# Patient Record
Sex: FEMALE | Race: ASIAN | NOT HISPANIC OR LATINO | Employment: FULL TIME | URBAN - METROPOLITAN AREA
[De-identification: names, ages, dates, MRNs, and addresses within clinical notes are randomized per-mention and may not be internally consistent; named-entity substitution may affect disease eponyms.]

---

## 2017-01-19 ENCOUNTER — PRENATAL OFFICE VISIT - HEALTHEAST (OUTPATIENT)
Dept: FAMILY MEDICINE | Facility: CLINIC | Age: 27
End: 2017-01-19

## 2017-01-19 ENCOUNTER — HOSPITAL ENCOUNTER (OUTPATIENT)
Dept: ULTRASOUND IMAGING | Facility: HOSPITAL | Age: 27
Discharge: HOME | End: 2017-01-19
Attending: FAMILY MEDICINE

## 2017-01-19 DIAGNOSIS — Z3A.10 10 WEEKS GESTATION OF PREGNANCY: ICD-10-CM

## 2017-01-19 LAB — HIV 1+2 AB+HIV1 P24 AG SERPL QL IA: NEGATIVE

## 2017-01-20 LAB
HBV SURFACE AG SERPL QL IA: NEGATIVE
SYPHILIS RPR SCREEN - HISTORICAL: NORMAL

## 2017-02-23 ENCOUNTER — PRENATAL OFFICE VISIT - HEALTHEAST (OUTPATIENT)
Dept: FAMILY MEDICINE | Facility: CLINIC | Age: 27
End: 2017-02-23

## 2017-02-23 DIAGNOSIS — Z34.90 PREGNANCY: ICD-10-CM

## 2017-03-27 ENCOUNTER — HOSPITAL ENCOUNTER (OUTPATIENT)
Dept: ULTRASOUND IMAGING | Facility: HOSPITAL | Age: 27
Discharge: HOME | End: 2017-03-27
Attending: FAMILY MEDICINE

## 2017-03-27 ENCOUNTER — COMMUNICATION - HEALTHEAST (OUTPATIENT)
Dept: FAMILY MEDICINE | Facility: CLINIC | Age: 27
End: 2017-03-27

## 2017-03-27 DIAGNOSIS — Z34.90 PREGNANCY: ICD-10-CM

## 2017-03-27 DIAGNOSIS — Z33.1 PREGNANT STATE, INCIDENTAL: ICD-10-CM

## 2017-03-30 ENCOUNTER — PRENATAL OFFICE VISIT - HEALTHEAST (OUTPATIENT)
Dept: FAMILY MEDICINE | Facility: CLINIC | Age: 27
End: 2017-03-30

## 2017-03-30 DIAGNOSIS — Z3A.20 20 WEEKS GESTATION OF PREGNANCY: ICD-10-CM

## 2017-04-27 ENCOUNTER — PRENATAL OFFICE VISIT - HEALTHEAST (OUTPATIENT)
Dept: FAMILY MEDICINE | Facility: CLINIC | Age: 27
End: 2017-04-27

## 2017-04-27 DIAGNOSIS — Z3A.24 24 WEEKS GESTATION OF PREGNANCY: ICD-10-CM

## 2017-04-28 LAB — SYPHILIS RPR SCREEN - HISTORICAL: NORMAL

## 2017-05-03 ENCOUNTER — AMBULATORY - HEALTHEAST (OUTPATIENT)
Dept: LAB | Facility: CLINIC | Age: 27
End: 2017-05-03

## 2017-05-03 DIAGNOSIS — Z3A.24 24 WEEKS GESTATION OF PREGNANCY: ICD-10-CM

## 2017-05-03 DIAGNOSIS — O24.419 GESTATIONAL DIABETES: ICD-10-CM

## 2017-05-12 ENCOUNTER — AMBULATORY - HEALTHEAST (OUTPATIENT)
Dept: EDUCATION SERVICES | Facility: CLINIC | Age: 27
End: 2017-05-12

## 2017-05-12 DIAGNOSIS — O24.410 DIET CONTROLLED GESTATIONAL DIABETES MELLITUS (GDM), ANTEPARTUM: ICD-10-CM

## 2017-05-12 DIAGNOSIS — Z3A.24 24 WEEKS GESTATION OF PREGNANCY: ICD-10-CM

## 2017-05-16 ENCOUNTER — AMBULATORY - HEALTHEAST (OUTPATIENT)
Dept: EDUCATION SERVICES | Facility: CLINIC | Age: 27
End: 2017-05-16

## 2017-05-16 DIAGNOSIS — Z3A.27 27 WEEKS GESTATION OF PREGNANCY: ICD-10-CM

## 2017-05-16 DIAGNOSIS — O24.410 DIET CONTROLLED GESTATIONAL DIABETES MELLITUS (GDM), ANTEPARTUM: ICD-10-CM

## 2017-05-25 ENCOUNTER — PRENATAL OFFICE VISIT - HEALTHEAST (OUTPATIENT)
Dept: FAMILY MEDICINE | Facility: CLINIC | Age: 27
End: 2017-05-25

## 2017-05-25 DIAGNOSIS — Z34.90 PREGNANCY: ICD-10-CM

## 2017-05-30 ENCOUNTER — AMBULATORY - HEALTHEAST (OUTPATIENT)
Dept: EDUCATION SERVICES | Facility: CLINIC | Age: 27
End: 2017-05-30

## 2017-05-30 ENCOUNTER — COMMUNICATION - HEALTHEAST (OUTPATIENT)
Dept: EDUCATION SERVICES | Facility: CLINIC | Age: 27
End: 2017-05-30

## 2017-06-05 ENCOUNTER — COMMUNICATION - HEALTHEAST (OUTPATIENT)
Dept: FAMILY MEDICINE | Facility: CLINIC | Age: 27
End: 2017-06-05

## 2017-06-08 ENCOUNTER — PRENATAL OFFICE VISIT - HEALTHEAST (OUTPATIENT)
Dept: FAMILY MEDICINE | Facility: CLINIC | Age: 27
End: 2017-06-08

## 2017-06-08 ENCOUNTER — HOSPITAL ENCOUNTER (OUTPATIENT)
Dept: ULTRASOUND IMAGING | Facility: HOSPITAL | Age: 27
Discharge: HOME | End: 2017-06-08
Attending: FAMILY MEDICINE

## 2017-06-08 DIAGNOSIS — Z3A.30 30 WEEKS GESTATION OF PREGNANCY: ICD-10-CM

## 2017-06-09 ENCOUNTER — COMMUNICATION - HEALTHEAST (OUTPATIENT)
Dept: FAMILY MEDICINE | Facility: CLINIC | Age: 27
End: 2017-06-09

## 2017-06-12 ENCOUNTER — PRENATAL OFFICE VISIT - HEALTHEAST (OUTPATIENT)
Dept: FAMILY MEDICINE | Facility: CLINIC | Age: 27
End: 2017-06-12

## 2017-06-12 DIAGNOSIS — Z3A.31 31 WEEKS GESTATION OF PREGNANCY: ICD-10-CM

## 2017-06-12 LAB
ALT SERPL W P-5'-P-CCNC: 9 U/L (ref 0–45)
AST SERPL W P-5'-P-CCNC: 21 U/L (ref 0–40)
CREAT SERPL-MCNC: 0.75 MG/DL (ref 0.6–1.1)
GFR SERPL CREATININE-BSD FRML MDRD: >60 ML/MIN/1.73M2

## 2017-06-26 ENCOUNTER — AMBULATORY - HEALTHEAST (OUTPATIENT)
Dept: FAMILY MEDICINE | Facility: CLINIC | Age: 27
End: 2017-06-26

## 2017-06-26 DIAGNOSIS — O14.93 PRE-ECLAMPSIA IN THIRD TRIMESTER: ICD-10-CM

## 2017-07-21 ENCOUNTER — COMMUNICATION - HEALTHEAST (OUTPATIENT)
Dept: FAMILY MEDICINE | Facility: CLINIC | Age: 27
End: 2017-07-21

## 2017-08-14 ENCOUNTER — OFFICE VISIT - HEALTHEAST (OUTPATIENT)
Dept: FAMILY MEDICINE | Facility: CLINIC | Age: 27
End: 2017-08-14

## 2017-08-14 DIAGNOSIS — Z30.011 BCP (BIRTH CONTROL PILLS) INITIATION: ICD-10-CM

## 2017-09-19 ENCOUNTER — COMMUNICATION - HEALTHEAST (OUTPATIENT)
Dept: FAMILY MEDICINE | Facility: CLINIC | Age: 27
End: 2017-09-19

## 2017-10-09 ENCOUNTER — COMMUNICATION - HEALTHEAST (OUTPATIENT)
Dept: FAMILY MEDICINE | Facility: CLINIC | Age: 27
End: 2017-10-09

## 2017-10-12 ENCOUNTER — AMBULATORY - HEALTHEAST (OUTPATIENT)
Dept: LAB | Facility: CLINIC | Age: 27
End: 2017-10-12

## 2018-08-24 ENCOUNTER — COMMUNICATION - HEALTHEAST (OUTPATIENT)
Dept: FAMILY MEDICINE | Facility: CLINIC | Age: 28
End: 2018-08-24

## 2018-11-29 ENCOUNTER — COMMUNICATION - HEALTHEAST (OUTPATIENT)
Dept: FAMILY MEDICINE | Facility: CLINIC | Age: 28
End: 2018-11-29

## 2020-03-03 ENCOUNTER — VIRTUAL VISIT (OUTPATIENT)
Dept: FAMILY MEDICINE | Facility: OTHER | Age: 30
End: 2020-03-03

## 2021-05-16 ENCOUNTER — OFFICE VISIT (OUTPATIENT)
Dept: URGENT CARE | Facility: URGENT CARE | Age: 31
End: 2021-05-16
Payer: COMMERCIAL

## 2021-05-16 VITALS
HEART RATE: 88 BPM | DIASTOLIC BLOOD PRESSURE: 81 MMHG | RESPIRATION RATE: 18 BRPM | OXYGEN SATURATION: 96 % | TEMPERATURE: 98 F | WEIGHT: 146.2 LBS | SYSTOLIC BLOOD PRESSURE: 123 MMHG

## 2021-05-16 DIAGNOSIS — H18.891 CORNEAL IRRITATION OF RIGHT EYE: Primary | ICD-10-CM

## 2021-05-16 PROCEDURE — 99203 OFFICE O/P NEW LOW 30 MIN: CPT | Performed by: PHYSICIAN ASSISTANT

## 2021-05-16 RX ORDER — FOLIC ACID, .BETA.-CAROTENE, ASCORBIC ACID, CHOLECALCIFEROL, .ALPHA.-TOCOPHEROL ACETATE, DL-, THIAMINE MONONITRATE, RIBOFLAVIN, NIACINAMIDE, PYRIDOXINE HYDROCHLORIDE, CYANOCOBALAMIN, CALCIUM PANTOTHENATE, CALCIUM CARBONATE, FERROUS FUMARATE, AND ZINC OXIDE 1; 1000; 100; 400; 30; 3; 3; 15; 20; 12; 7; 200; 29; 20 MG/1; [IU]/1; MG/1; [IU]/1; [IU]/1; MG/1; MG/1; MG/1; MG/1; UG/1; MG/1; MG/1; MG/1; MG/1
TABLET, CHEWABLE ORAL
COMMUNITY
End: 2023-03-03

## 2021-05-16 RX ORDER — POLYETHYLENE GLYCOL 400 AND PROPYLENE GLYCOL 4; 3 MG/ML; MG/ML
1 SOLUTION/ DROPS OPHTHALMIC 3 TIMES DAILY PRN
Qty: 5 ML | Refills: 0 | Status: SHIPPED | OUTPATIENT
Start: 2021-05-16 | End: 2022-03-24

## 2021-05-16 ASSESSMENT — ENCOUNTER SYMPTOMS
PHOTOPHOBIA: 0
EYE PAIN: 1
FEVER: 0

## 2021-05-16 NOTE — PROGRESS NOTES
SUBJECTIVE:   Ariella Peterson is a 30 year old female presenting with a chief complaint of   Chief Complaint   Patient presents with     Eye Problem     Right eye irritation since she woke up yesterday. Pt had lasik last Friday       She is a new patient of [place].  Patient presents with right eye irritation since yesterday morning.  Patient had bilateral lasik surgery on 5/6.  Patient noted a white spot on upper iris.  Right eye more discharge upon awakening.  No acuity changes. No photophobia.  Patient called optho and was told they were closed and they would get back to her on Monday.  Patient given eye drops to put in - abx gtts.  No trauma and doesn't recall anything getting in eye.        Review of Systems   Constitutional: Negative for fever.   Eyes: Positive for pain. Negative for photophobia.   All other systems reviewed and are negative.      History reviewed. No pertinent past medical history.  History reviewed. No pertinent family history.  Current Outpatient Medications   Medication Sig Dispense Refill     polyethylene glycol-propylene glycol (SYSTANE) 0.4-0.3 % SOLN ophthalmic solution Place 1 drop into the right eye 3 times daily as needed for dry eyes 5 mL 0     Prenatal Vit-Fe Fumarate-FA (PRENATAL 19) 29-1 MG CHEW        Social History     Tobacco Use     Smoking status: Never Smoker     Smokeless tobacco: Never Used   Substance Use Topics     Alcohol use: Not on file       OBJECTIVE  /81   Pulse 88   Temp 98  F (36.7  C) (Tympanic)   Resp 18   Wt 66.3 kg (146 lb 3.2 oz)   SpO2 96%     Physical Exam  Vitals signs and nursing note reviewed.   Constitutional:       Appearance: Normal appearance. She is normal weight.   Eyes:      Extraocular Movements: Extraocular movements intact.      Conjunctiva/sclera: Conjunctivae normal.      Pupils: Pupils are equal, round, and reactive to light.      Comments: Visualization with light and with magnifying glass reveals no FB.  No FB noted on eye lid  eversion.  EOM, PERRLA normal.     Neurological:      Mental Status: She is alert.         Labs:  No results found for this or any previous visit (from the past 24 hour(s)).    X-Ray was not done.    ASSESSMENT:      ICD-10-CM    1. Corneal irritation of right eye  H18.891 polyethylene glycol-propylene glycol (SYSTANE) 0.4-0.3 % SOLN ophthalmic solution        Medical Decision Making:    Differential Diagnosis:  FB,     Serious Comorbid Conditions:  Adult:  as above    PLAN:    Discussed with patient that no FB is viewed, no is any abnormalities.  I recommended she continue with abx gtts.  Rx for Systane and f/u with optho on Monday.  I've encouraged her to not rub her eye or touch it.      Followup:    If not improving or if condition worsens, follow up with your Primary Care Provider, If not improving or if conditions worsens over the next 12-24 hours, go to the Emergency Department    There are no Patient Instructions on file for this visit.

## 2021-05-30 VITALS — WEIGHT: 169 LBS | BODY MASS INDEX: 33.01 KG/M2

## 2021-05-30 VITALS — BODY MASS INDEX: 31.44 KG/M2 | WEIGHT: 161 LBS

## 2021-05-30 VITALS — WEIGHT: 154 LBS | BODY MASS INDEX: 30.08 KG/M2

## 2021-05-30 VITALS — BODY MASS INDEX: 28.51 KG/M2 | WEIGHT: 146 LBS

## 2021-05-30 VITALS — WEIGHT: 143 LBS | BODY MASS INDEX: 27.93 KG/M2

## 2021-05-31 VITALS — WEIGHT: 176 LBS | BODY MASS INDEX: 34.37 KG/M2

## 2021-05-31 VITALS — WEIGHT: 168.6 LBS | BODY MASS INDEX: 32.93 KG/M2

## 2021-05-31 VITALS — WEIGHT: 168 LBS | BODY MASS INDEX: 32.81 KG/M2

## 2021-05-31 VITALS — WEIGHT: 177 LBS | BODY MASS INDEX: 34.57 KG/M2

## 2021-05-31 VITALS — WEIGHT: 144 LBS | BODY MASS INDEX: 29.08 KG/M2

## 2021-06-08 NOTE — PROGRESS NOTES
PRENATAL VISIT   FIRST OBSTETRICAL EXAM - OB    Assessment / Impression     Healthy 26-year-old  currently attempting to 3 days gestation.  She's had no bleedings or complications with the pregnancy.  She is experiencing some mild nausea and vomiting which occurs primarily at night.  The plan to switch her over to the coming vitamin to see if that helps with her nighttime symptoms as the prenatal vitamin seems to trigger.  We did not hear heart tones at today's visit so she is referred for an ultrasound.  We discussed what to expect during the first trimester and upcoming early second trimester.  She is considering genetic screening.  Normal first prenatal visit at 10w3d  Discussed orientation, general information, lifestyle, nutrition, exercise,warning signs, resources, lab testing, risk screening and discussed cystic fibrosis screening with patient.  Questions answered.    Plan:     Ultrasound, routine labs today.   Initial labs drawn.  Prenatal vitamins.  Problem list reviewed and updated.  Genetic screening test options discussed:  Patient elects She is considering options between the early screening and the 16 week but test.  Role of ultrasound in pregnancy discussed; fetal survey: requested.  Follow up: No Follow-up on file.      Subjective:    Ariella Peterson is a 26 y.o.  here today for her First Obstetrical Exam.   She is a healthy 26-year-old female.  She had a miscarriage back in May.  This is her second pregnancy.  She's had no bleeding or cramping during this pregnancy.  She has had some early nausea and vomiting which occurs primarily at night and taking her prenatal vitamins seems to be a little bit of a trigger for her symptoms.  She is not taking anything for the nausea or vomiting.  She has no significant family history of early pregnancy losses.  There is no significant genetic history on either side either.    OB History    Para Term  AB SAB TAB Ectopic Multiple Living   2    1 1           # Outcome Date GA Lbr Nickolas/2nd Weight Sex Delivery Anes PTL Lv   2 Current            1 SAB 05/19/16                  Expected Date of Delivery: 8/14/2017, by Ultrasound    Past Medical History   Diagnosis Date     Miscarriage      Past Surgical History   Procedure Laterality Date     Anterior cruciate ligament repair Bilateral      Social History   Substance Use Topics     Smoking status: Never Smoker     Smokeless tobacco: None     Alcohol use No     Current Outpatient Prescriptions   Medication Sig Dispense Refill     prenatal vitamin iron-folic acid 27mg-0.8mg (PRENATAL S) 27 mg iron- 800 mcg Tab tablet Take 1 tablet by mouth daily.       prenatal #115-iron-folic acid 29 mg iron- 1 mg Chew Chew daily.       No current facility-administered medications for this visit.      Allergies   Allergen Reactions     Keflex [Cephalexin] Hives             High Risk Behavior: None    Review of Systems  General:  Denies problem  Eyes: Denies problem  Ears/Nose/Throat: Denies problem  Cardiovascular: Denies problem  Respiratory:  Denies problem  Gastrointestinal:  Denies problem, Genitourinary: Denies problem  Musculoskeletal:  Denies problem  Skin: Denies problem  Neurologic: Denies problem  Psychiatric: Denies problem  Endocrine: Denies problem  Heme/Lymphatic: Denies problem   Allergic/Immunologic: Denies problem       Objective:   Objective    Vitals:    01/19/17 0812   BP: 122/80   Weight: 143 lb (64.9 kg)     Physical Exam:  General Appearance: Alert, cooperative, no distress, appears stated age  Head: Normocephalic, without obvious abnormality, atraumatic  Eyes: PERRL, conjunctiva/corneas clear, EOM's intact  Ears: Normal TM's and external ear canals, both ears  Nose: Nares normal, septum midline,mucosa normal, no drainage  Throat: Lips, mucosa, and tongue normal; teeth and gums normal  Neck: Supple, symmetrical, trachea midline, no adenopathy;  thyroid: not enlarged, symmetric, no tenderness/mass/nodules; no  carotid bruit or JVD  Back: Symmetric, no curvature, ROM normal, no CVA tenderness  Lungs: Clear to auscultation bilaterally, respirations unlabored  Breasts: No breast masses, tenderness, asymmetry, or nipple discharge.  Heart: Regular rate and rhythm, S1 and S2 normal, no murmur, rub, or gallop, Abdomen: Soft, non-tender, bowel sounds active all four quadrants,  no masses, no organomegaly  Pelvic:Normally developed genitalia with no external lesions or eruptions. Vagina and cervix show no lesions, inflammation, discharge or tenderness. No cystocele, No rectocele. Uterus 10.  No adnexal mass or tenderness.    Extremities: Extremities normal, atraumatic, no cyanosis or edema  Skin: Skin color, texture, turgor normal, no rashes or lesions  Lymph nodes: Cervical, supraclavicular, and axillary nodes normal  Neurologic: Normal     Lab:   Results for orders placed or performed in visit on 12/19/16   Pregnancy, Urine   Result Value Ref Range    Pregnancy Test, Urine Positive (!) Negative

## 2021-06-09 NOTE — PROGRESS NOTES
Doing well.  Would like to have quad screen done for genetic screening.  Order is placed for her to return at 16 weeks.  Will order 20 week fetal survey and she will see me again after the us. She is feeling occ fluttering.  She denies nausea and fatigue.  She is tolerating the gummy prenatal vitamins better.

## 2021-06-09 NOTE — PROGRESS NOTES
Doing well, no concerns  Feeling fetal movement  Decided not to do genetic screening  Fetal survey is normal- baby boy!! Rivera

## 2021-06-10 NOTE — PROGRESS NOTES
[facility] GDM Care Plan      Time: 30 minutes  Visit Type: GDM Individual Follow-up  Visit #: 2    Provider: Dr. TANYA Lee  Provider's Diagnosis (per referral form): Gestational (648.83)    Weight: 168 lb 9.6 oz (76.5 kg)  OGTT:   Results for orders placed or performed in visit on 05/03/17   Glucose, Gestational Challenge 2 Hour   Result Value Ref Range    Glucose, 2 Hour 180 (H) 70 - 154 mg/dL   Glucose, Gestational Challenge 1 Hour   Result Value Ref Range    Glucose, 1 Hour 210 (H) 70 - 179 mg/dL   Glucose, Gestational Challenge Fasting   Result Value Ref Range    Glucose, Fasting 76 70 - 94 mg/dL    Patient Fasting > 8hrs? Yes    Glucose, Gestational Challenge 3 Hour   Result Value Ref Range    Glucose, 3 hour 158 (H) 70 - 139 mg/dL     EDC: Estimated Date of Delivery: 8/14/17 =  Pregnancy #: 2  Previous GDM: No  Medications:   Current Outpatient Prescriptions:      acetone, urine, test Strp, Use 1 strip each morning to check ketones, Disp: 100 strip, Rfl: 1     blood glucose test (ACCU-CHEK NICOLE PLUS TEST STRP) strips, Use 1 each As Directed 4 (four) times a day., Disp: 200 each, Rfl: 2     generic lancets (ACCU-CHEK FASTCLIX), Use 1 needle four times daily to check blood sugar, Disp: 102 each, Rfl: 3     prenatal #115-iron-folic acid 29 mg iron- 1 mg Chew, Chew daily., Disp: , Rfl:   Supplements: No  PNV: Yes  BG: Ariella is here today to follow-up after her initial consult last week.  She did not bring her book, but did bring her meter.  Readings are noted below:  F-78/87/83/98-slept until noon  B-120/188-leftovers from Mother's Day/117  L-161/108/149/123  D-142/86/143  Ketones- checked once- negative.  Ariella stated it is hard to remember to check her blood sugars and remembering what they should be.  We reviewed goal ranges for testing and when to test.  Dicussed traditional Asian food can make this more difficult due to it being a lot more home cooked meals and food mixed together.  We talked about trying her  "\"normal\" portion size ans see how this effects glucose, if high then have a smaller portion next time.  Discussed rice, Hmong corn, noodle and fruit are most likely going to be the parts of her meal plan that will effect her glucose the most.   She will continue to work on finding foods and portions that will keep her glucose readings within range.    All additional questions and concerns addressed.  Patient will follow-up in clinic in 2 weeks.  Discussed she should call before appointment if she has 3 high fasting readings in 7 days, 3 high unexplainable post meal readings in 7 days or 2 days of large/moderate ketones.  Patient agreed with plan.  BG monitoring goals: Fasting <95; 1 hour post start of meal <140. Test 4 x per day.  Check fasting a.m. ketones: Yes  GDM meal pattern/carb counting taught per guidelines: Yes    DIABETES CARE PLAN AND EDUCATION RECORD    Gestational Diabetes Disease Process/Preconception Care/Management During Pregnancy/Postpartum:Assessed and Discussed    Meter (per above goals): Assessed and Discussed    Nutrition Management    Weight: Assessed and Discussed  Portions/Balance: Assessed and discussed  Carb ID/Count: Assessed and discussed  Label Reading: Assessed and discussed  Menu Planning: Assessed and Discussed  Dining Out: Assessed and Discussed  Physical Activity: Assessed and Discussed  Medications: Assessed and Discussed    Acute Complications: Prevent, Detect, Treat:    Hypoglycemia: Assessed and Discussed  Hyperglycemia: Assessed and Discussed  Goal Setting and Problem Solving: Assessed and Discussed  Barriers: Assessed and Discussed  Psychosocial Adjustments: Assessed and Discussed      "

## 2021-06-10 NOTE — PROGRESS NOTES
Doing well over all. Feeling intermittently dizzy through out day.  No syncope.  No palpitations or headaches.    Check hgb, 1 hr gtt  Increase frequency of meals and protein in diet

## 2021-06-10 NOTE — PROGRESS NOTES
"[facility] GDM Care Plan    Assessment:  Ariella is here for her initial Gestational Diabetes education.  She had many questions about what would happen if her glucose was too high, and if her readings were all within \"normal\" range how long she would have to continue checking her glucose.  Answered her questions and suggested she discuss length of testing with primary.    Plan:    Provider: Dr. TANYA Lee  Provider's Diagnosis (per referral form): Gestational (648.83)    Weight: 169 lb (76.7 kg)  OGTT:   Results for orders placed or performed in visit on 17   Glucose, Gestational Challenge 2 Hour   Result Value Ref Range    Glucose, 2 Hour 180 (H) 70 - 154 mg/dL   Glucose, Gestational Challenge 1 Hour   Result Value Ref Range    Glucose, 1 Hour 210 (H) 70 - 179 mg/dL   Glucose, Gestational Challenge Fasting   Result Value Ref Range    Glucose, Fasting 76 70 - 94 mg/dL    Patient Fasting > 8hrs? Yes    Glucose, Gestational Challenge 3 Hour   Result Value Ref Range    Glucose, 3 hour 158 (H) 70 - 139 mg/dL     EDC: Estimated Date of Delivery: 17   Pregnancy #: 2, SAB-1  Previous GDM: No  Medications:   Current Outpatient Prescriptions:      prenatal #115-iron-folic acid 29 mg iron- 1 mg Chew, Chew daily., Disp: , Rfl:   Supplements: No  PNV: Yes  Meter: Accu-chek [employer]  B about 3 hours after eating 1 cup rice, bokchoy and pork.  BG monitoring goals: Fasting <95; 1 hour post start of meal <140. Test 4 x per day.  Check fasting a.m. ketones: Yes  GDM meal pattern/carb counting taught per guidelines: Yes  Goals: Nutrition: GDM meal plan  Activity:  Walking after meals when able/staying active  Monitoring:  BG 4x/day as directed, ketones every morning    F/u in 1 week to assess BG and food log     DIABETES CARE PLAN AND EDUCATION RECORD    Gestational Diabetes Disease Process/Preconception Care/Management During Pregnancy/Postpartum:    Meter (per above goals): Discussed, Literature provided and Patient " returned demonstration    Nutrition Management    Weight: Assessed, Discussed and Literature provided  Portions/Balance: Assessed, Discussed and Literature provided  Carb ID/Count: Assessed, Discussed and Literature provided  Label Reading: Assessed, Discussed and Literature provided  Menu Planning: Assessed, Discussed and Literature provided  Dining Out: Assessed, Discussed and Literature provided  Physical Activity: Discussed and Literature provided    Acute Complications: Prevent, Detect, Treat:    Goal Setting and Problem Solving: Assessed and Discussed  Barriers: Assessed and Discussed  Psychosocial Adjustments: Assessed and Discussed      Time: 60 minutes  Visit Type: GDM Individual  Visit #: 1

## 2021-06-10 NOTE — PROGRESS NOTES
BS have been well controlled, diet controlled  Good fm  Swelling in feet- discussed symptoms of preeclampsia  Follow up in 2 weeks

## 2021-06-11 NOTE — PROGRESS NOTES
Urine protein to creatinine ratio came back at 0.36.  Her blood pressures are currently normal. Does not meet criteria for preeclampsia however plan short term follow up in 3-4 days.  Discussed in detail signs and symptoms and when to be seen

## 2021-06-11 NOTE — PROGRESS NOTES
26-year-old  female at 31 weeks and 1 day gestation.  She is an EDC of 2017.  This pregnancy has been complicated by gestational diabetes, diet controlled.  Last week she came in with some increase in her swelling.  Her blood pressures at that time were normal however her urine protein to creatinine ratio came back to slightly elevated at 0.36.  She then returned 4 days later for a recheck.  At that time her blood pressures were systolic 138 and 144.  We rechecked her labs and her urine protein to creatinine ratio increased to 0.96.  Remainder of tests including liver panel, hemoglobin, platelets and creatinine were all normal.  She was denying any symptoms of headache, vision changes, or right upper quadrant pain.  We were concerned about the possible diagnosis of preeclampsia.  I consulted with OB over the phone and we may plan to admit patient for further evaluation.  She will be admitted to labor and delivery.  Will monitor blood pressures, repeat a health panel, and delivered betamethasone injections.  Will obtain a formal consult from obstetrics while she is in labor and delivery.  We have contacted the patient and she will be arriving today for this evaluation.

## 2021-06-11 NOTE — PROGRESS NOTES
26-year-old  at 31 weeks gestation.  She has been experiencing some increased edema.  At her last visit, we checked a urine protein to creatinine ratio.  It was slightly elevated at 0.36.  Her blood pressure at that time however was normal.  Her blood pressure today is slightly elevated.  Initial reading was 138/80 and follow-up reading was 144/80.  She is not expressing any headaches, vision changes, or right upper quadrant pain.  Her hemoglobin and platelets are currently normal.  We are awaiting the remainder of her health panel labs.  We are also repeating a urine protein to creatinine ratio.  I am placing her on bedrest with monitoring for preeclampsia.  Due to early gestation, will consider consult with obstetrics if repeat urine protein to creatinine ratio is elevated.

## 2021-06-11 NOTE — PROGRESS NOTES
26-year-old  at 30 weeks and 3 days gestation.  She has had about 8 pounds weight gain since last visit has had an increase in her edema.  She is denying any headaches, vision changes or pain in her right upper side.  She has no history of hypertension.  She has gestational diabetic.  She states her blood sugars have been well controlled on just diet.  Her highest postprandial she will blood sugar has been 150.  The rest of were all within normal range.  Her fasting blood sugars are in the 70s-80s.  She plans to follow-up with gestational diabetes education.  She is measuring measuring large for dates.  We will order an ultrasound for further evaluation.  I recommend support stockings.  Will check a urine protein to creatinine ratio.  We discussed signs of preeclampsia and when she should be seen.  Follow-up in 2 weeks.

## 2021-06-12 NOTE — PROGRESS NOTES
Assessment:        Ariella Peterson is a 26 y.o. female here for postpartum exam at 6 weeks postpartum. Pap smear not done at today's visit. No diagnosis found.. .  S/P vaginal delivery at 34 weeks induced for severe preeclampsia.  Pregnancy was complicated by GDM.  Plan:        1. Yearly blood sugar monitoring, Monitor blood pressure, advised on risk for future pregnancies  2. Contraception: oral progesterone-only contraceptive  3. No Follow-up on file.      Subjective:       Ariella Peterson is a 26 y.o. female who presents for a postpartum visit. She is 6 weeks postpartum following a spontaneous vaginal delivery. I have fully reviewed the prenatal and intrapartum course. The delivery was at 34 gestational weeks. Outcome: spontaneous vaginal delivery. Postpartum course has been uncomplicated. Baby's course has been extended stay at [facility] due to early gestation. Baby is feeding by bottle. Bled for  4 weeks postpartum.  Currently bleeding  no bleeding. Bowel function is normal. Bladder function is normal. Patient has not resumed intercourse. Contraception method is none. Postpartum depression screening score: reviewed     The following portions of the patient's history were reviewed and updated as appropriate: allergies, current medications and problem list    Review of Systems  General:  Denies problem  Eyes: Denies problem  Ears/Nose/Throat: Denies problem  Cardiovascular: Denies problem  Respiratory:  Denies problem  Gastrointestinal:  Denies problem, Genitourinary: Denies problem  Musculoskeletal:  Denies problem  Skin: Denies problem  Neurologic: Denies problem  Psychiatric: Denies problem  Endocrine: Denies problem  Heme/Lymphatic: Denies problem   Allergic/Immunologic: Denies problem          Objective:         Vitals:    08/14/17 1545   BP: 118/78   Pulse: 64   Weight: 144 lb (65.3 kg)       Physical Exam:  General Appearance: Alert, cooperative, no distress, appears stated age  Head: Normocephalic, without obvious  abnormality, atraumatic  Eyes: PERRL, conjunctiva/corneas clear, EOM's intact  Ears: Normal TM's and external ear canals, both ears  Nose: Nares normal, septum midline,mucosa normal, no drainage  Throat: Lips, mucosa, and tongue normal; teeth and gums normal  Neck: Supple, symmetrical, trachea midline, no adenopathy;  thyroid: not enlarged, symmetric, no tenderness/mass/nodules; no carotid bruit or JVD  Back: Symmetric, no curvature, ROM normal, no CVA tenderness  Lungs: Clear to auscultation bilaterally, respirations unlabored  Breasts: No breast masses, tenderness, asymmetry, or nipple discharge.  Heart: Regular rate and rhythm, S1 and S2 normal, no murmur, rub, or gallop, Abdomen: Soft, non-tender, bowel sounds active all four quadrants,  no masses, no organomegaly  Pelvic:Normally developed genitalia with no external lesions or eruptions. Vagina and cervix show no lesions, inflammation, discharge or tenderness. No cystocele, No rectocele. Uterus normal.  No adnexal mass or tenderness.      Extremities: Extremities normal, atraumatic, no cyanosis or edema  Skin: Skin color, texture, turgor normal, no rashes or lesions  Lymph nodes: Cervical, supraclavicular, and axillary nodes normal  Neurologic: Normal

## 2021-06-15 PROBLEM — Z34.90 PREGNANCY: Status: ACTIVE | Noted: 2017-02-23

## 2021-06-16 PROBLEM — O14.93 PRE-ECLAMPSIA IN THIRD TRIMESTER: Status: ACTIVE | Noted: 2017-06-26

## 2021-06-16 PROBLEM — I10 HYPERTENSION: Status: ACTIVE | Noted: 2017-06-13

## 2021-07-03 NOTE — ADDENDUM NOTE
Addendum Note by Tom Conklin MD at 10/9/2017 12:56 PM     Author: Tom Conklin MD Service: -- Author Type: Physician    Filed: 10/9/2017 12:56 PM Encounter Date: 10/9/2017 Status: Signed    : Tom Conklin MD (Physician)    Addended by: TOM CONKLIN on: 10/9/2017 12:56 PM        Modules accepted: Orders

## 2021-10-09 ENCOUNTER — E-VISIT (OUTPATIENT)
Dept: URGENT CARE | Facility: CLINIC | Age: 31
End: 2021-10-09
Payer: COMMERCIAL

## 2021-10-09 DIAGNOSIS — J20.8 ACUTE BACTERIAL BRONCHITIS: Primary | ICD-10-CM

## 2021-10-09 DIAGNOSIS — B96.89 ACUTE BACTERIAL BRONCHITIS: Primary | ICD-10-CM

## 2021-10-09 PROCEDURE — 99421 OL DIG E/M SVC 5-10 MIN: CPT | Performed by: NURSE PRACTITIONER

## 2021-10-09 RX ORDER — PREDNISONE 20 MG/1
40 TABLET ORAL DAILY
Qty: 10 TABLET | Refills: 0 | Status: SHIPPED | OUTPATIENT
Start: 2021-10-09 | End: 2021-10-14

## 2021-10-09 RX ORDER — FLUTICASONE PROPIONATE 50 MCG
1 SPRAY, SUSPENSION (ML) NASAL DAILY
Qty: 11.1 ML | Refills: 0 | Status: SHIPPED | OUTPATIENT
Start: 2021-10-09 | End: 2022-03-24

## 2021-10-09 NOTE — PATIENT INSTRUCTIONS
"    Dear Ariella Peterson    After reviewing your responses, I've been able to diagnose you with \"Bronchitis\" which is a common infection of your lungs. While this is most commonly caused by a virus, the symptoms you have given suggest you should be treated with antibiotics.     I have sent prednisone and flonase to your pharmacy to treat this infection.     It is important that you take all of your prescribed medication even if your symptoms are improving after a few doses. Taking all of your medicine helps prevent the symptoms from returning.     If your symptoms worsen, you develop chest pain or shortness of breath, fevers over 101, or are not improving in 5 days, please contact your primary care provider for an appointment or visit any of our convenient Walk-in Care or Urgent Care Centers to be seen which can be found on our website here.    Thanks again for choosing us as your health care partner,    LILIAN Rodríguez CNP    "

## 2021-10-16 ENCOUNTER — HEALTH MAINTENANCE LETTER (OUTPATIENT)
Age: 31
End: 2021-10-16

## 2022-01-27 ENCOUNTER — E-VISIT (OUTPATIENT)
Dept: URGENT CARE | Facility: URGENT CARE | Age: 32
End: 2022-01-27
Payer: COMMERCIAL

## 2022-01-27 DIAGNOSIS — N39.0 ACUTE UTI (URINARY TRACT INFECTION): Primary | ICD-10-CM

## 2022-01-27 PROCEDURE — 99422 OL DIG E/M SVC 11-20 MIN: CPT | Performed by: PREVENTIVE MEDICINE

## 2022-01-27 RX ORDER — NITROFURANTOIN 25; 75 MG/1; MG/1
100 CAPSULE ORAL 2 TIMES DAILY
Qty: 10 CAPSULE | Refills: 0 | Status: SHIPPED | OUTPATIENT
Start: 2022-01-27 | End: 2022-02-01

## 2022-01-28 NOTE — PATIENT INSTRUCTIONS
Dear Ariella Peterson    After reviewing your responses, I've been able to diagnose you with a urinary tract infection, which is a common infection of the bladder with bacteria.  This is not a sexually transmitted infection, though urinating immediately after intercourse can help prevent infections.  Drinking lots of fluids is also helpful to clear your current infection and prevent the next one.      I have sent a prescription for antibiotics to your pharmacy to treat this infection.    It is important that you take all of your prescribed medication even if your symptoms are improving after a few doses.  Taking all of your medicine helps prevent the symptoms from returning.     If your symptoms worsen, you develop pain in your back or stomach, develop fevers, or are not improving in 5 days, please contact your primary care provider for an appointment or visit any of our convenient Walk-in or Urgent Care Centers to be seen, which can be found on our website here.    Thanks again for choosing us as your health care partner,    Hero Sy MD, MD    Urinary Tract Infections in Women  Urinary tract infections (UTIs) are most often caused by bacteria. These bacteria enter the urinary tract. The bacteria may come from inside the body. Or they may travel from the skin outside the rectum or vagina into the urethra. Female anatomy makes it easy for bacteria from the bowel to enter a woman s urinary tract, which is the most common source of UTI. This means women develop UTIs more often than men. Pain in or around the urinary tract is a common UTI symptom. But the only way to know for sure if you have a UTI for the healthcare provider to test your urine. The two tests that may be done are the urinalysis and urine culture.     Types of UTIs    Cystitis. A bladder infection (cystitis) is the most common UTI in women. You may have urgent or frequent need to pee. You may also have pain, burning when you pee, and  bloody urine.    Urethritis. This is an inflamed urethra, which is the tube that carries urine from the bladder to outside the body. You may have lower stomach or back pain. You may also have urgent or frequent need to pee.    Pyelonephritis. This is a kidney infection. If not treated, it can be serious and damage your kidneys. In severe cases, you may need to stay in the hospital. You may have a fever and lower back pain.    Medicines to treat a UTI  Most UTIs are treated with antibiotics. These kill the bacteria. The length of time you need to take them depends on the type of infection. It may be as short as 3 days. If you have repeated UTIs, you may need a low-dose antibiotic for several months. Take antibiotics exactly as directed. Don t stop taking them until all of the medicine is gone. If you stop taking the antibiotic too soon, the infection may not go away. You may also develop a resistance to the antibiotic. This can make it much harder to treat.   Lifestyle changes to treat and prevent UTIs   The lifestyle changes below will help get rid of your UTI. They may also help prevent future UTIs.     Drink plenty of fluids. This includes water, juice, or other caffeine-free drinks. Fluids help flush bacteria out of your body.    Empty your bladder. Always empty your bladder when you feel the urge to pee. And always pee before going to sleep. Urine that stays in your bladder can lead to infection. Try to pee before and after sex as well.    Practice good personal hygiene. Wipe yourself from front to back after using the toilet. This helps keep bacteria from getting into the urethra.    Use condoms during sex. These help prevent UTIs caused by sexually transmitted bacteria. Also don't use spermicides during sex. These can increase the risk for UTIs. Choose other forms of birth control instead. For women who tend to get UTIs after sex, a low-dose of a preventive antibiotic may be used. Be sure to discuss this  option with your healthcare provider.    Follow up with your healthcare provider as directed. He or she may test to make sure the infection has cleared. If needed, more treatment may be started.  Lucy last reviewed this educational content on 7/1/2019 2000-2021 [employer]. All rights reserved. This information is not intended as a substitute for professional medical care. Always follow your healthcare professional's instructions.

## 2022-02-24 ENCOUNTER — E-VISIT (OUTPATIENT)
Dept: FAMILY MEDICINE | Facility: CLINIC | Age: 32
End: 2022-02-24
Payer: COMMERCIAL

## 2022-02-24 DIAGNOSIS — T78.40XA ALLERGIC REACTION, INITIAL ENCOUNTER: Primary | ICD-10-CM

## 2022-02-24 PROCEDURE — 99207 PR NON-BILLABLE SERV PER CHARTING: CPT | Performed by: PHYSICIAN ASSISTANT

## 2022-02-25 NOTE — PATIENT INSTRUCTIONS
Dear Ariella Peterson,    We are sorry you are not feeling well. Based on the responses you provided, it is recommended that you be seen in-person in urgent care so we can better evaluate your symptoms. Please click here to find the nearest urgent care location to you.   You will not be charged for this Visit. Thank you for trusting us with your care.    Boaz Monae PA-C

## 2022-03-24 ENCOUNTER — VIRTUAL VISIT (OUTPATIENT)
Dept: FAMILY MEDICINE | Facility: CLINIC | Age: 32
End: 2022-03-24
Payer: COMMERCIAL

## 2022-03-24 DIAGNOSIS — L50.9 URTICARIA: Primary | ICD-10-CM

## 2022-03-24 PROCEDURE — 99213 OFFICE O/P EST LOW 20 MIN: CPT | Mod: 95 | Performed by: PHYSICIAN ASSISTANT

## 2022-03-24 RX ORDER — LETROZOLE 2.5 MG/1
TABLET, FILM COATED ORAL
COMMUNITY
Start: 2022-03-14 | End: 2023-03-03

## 2022-03-24 RX ORDER — CETIRIZINE HYDROCHLORIDE 10 MG/1
10 TABLET ORAL DAILY
COMMUNITY
Start: 2022-03-08

## 2022-03-24 NOTE — PATIENT INSTRUCTIONS
Follow up with allergist as soon as possible at the [place] location.  Continue zyrtec and/or benadryl as you have been taking  Return urgently if any change in symptoms like fever, pain, nausea, vomiting or other change in symptoms.     Schedule a face to face visit for your annual exam.

## 2022-03-24 NOTE — PROGRESS NOTES
Ariella is a 31 year old who is being evaluated via a billable video visit.      How would you like to obtain your AVS? MyChart  If the video visit is dropped, the invitation should be resent by: Text to cell phone: [phone]  Will anyone else be joining your video visit? No    Video Start Time: 431    Assessment & Plan     Urticaria  Improved with antihistamines. Follow up with allergist. Advised not to take antihistamines at least 5 days prior to appointment   - Adult Allergy/Asthma Referral               Patient Instructions   Follow up with allergist as soon as possible at the [place] location.  Continue zyrtec and/or benadryl as you have been taking  Return urgently if any change in symptoms like fever, pain, nausea, vomiting or other change in symptoms.     Schedule a face to face visit for your annual exam.        Return in about 4 weeks (around 4/21/2022), or if symptoms worsen or fail to improve, for in person, Routine preventive.    Alla Medley PA-C  [facility]    Tierra Krishnan is a 31 year old who presents for the following health issues     History of Present Illness       Reason for visit:  Itch all over my body  Symptom onset:  3-4 weeks ago  Symptoms include:  Allergy/hives  Symptom intensity:  Moderate  Symptom progression:  Staying the same  Had these symptoms before:  No  What makes it worse:  Itchy  What makes it better:  When I take zyrtec/benadryl    She eats 2-3 servings of fruits and vegetables daily.She consumes 1 sweetened beverage(s) daily.She exercises with enough effort to increase her heart rate 20 to 29 minutes per day.  She exercises with enough effort to increase her heart rate 3 or less days per week.   She is taking medications regularly.       I m experiencing some itchy condition resulting in hives, bumps all over my body. I ve gone to urgent care and was given prednisone and that has not resolve it. I ve taken Zyrtec and Benadryl and the  "itch keeps coming back. I have not had any new changes that I m aware that would result any kind of allergy reactions.    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      Objective    Vitals - Patient Reported  Weight (Patient Reported): 65.8 kg (145 lb)  Height (Patient Reported): 149.9 cm (4' 11\")  BMI (Based on Pt Reported Ht/Wt): 29.29  Pain Score: No Pain (0)        Physical Exam   GENERAL: Healthy, alert and no distress  EYES: Eyes grossly normal to inspection.  No discharge or erythema, or obvious scleral/conjunctival abnormalities.  RESP: No audible wheeze, cough, or visible cyanosis.  No visible retractions or increased work of breathing.    SKIN: excoriation - arms and uritcarial lesions   NEURO: Cranial nerves grossly intact.  Mentation and speech appropriate for age.  PSYCH: Mentation appears normal, affect normal/bright, judgement and insight intact, normal speech and appearance well-groomed.                Video-Visit Details    Type of service:  Video Visit    Video End Time:4:41 PM    Originating Location (pt. Location): Home    Distant Location (provider location):  [facility]     Platform used for Video Visit: Eneida  "

## 2022-05-31 ENCOUNTER — OFFICE VISIT (OUTPATIENT)
Dept: ALLERGY | Facility: CLINIC | Age: 32
End: 2022-05-31
Payer: COMMERCIAL

## 2022-05-31 VITALS
SYSTOLIC BLOOD PRESSURE: 118 MMHG | WEIGHT: 150.1 LBS | DIASTOLIC BLOOD PRESSURE: 76 MMHG | HEART RATE: 88 BPM | OXYGEN SATURATION: 99 % | BODY MASS INDEX: 30.32 KG/M2

## 2022-05-31 DIAGNOSIS — L50.3 DERMATOGRAPHISM: Primary | ICD-10-CM

## 2022-05-31 PROCEDURE — 99203 OFFICE O/P NEW LOW 30 MIN: CPT | Performed by: ALLERGY & IMMUNOLOGY

## 2022-05-31 RX ORDER — DIPHENHYDRAMINE HCL 25 MG
25 TABLET ORAL EVERY 6 HOURS PRN
COMMUNITY
End: 2023-03-03

## 2022-05-31 ASSESSMENT — ENCOUNTER SYMPTOMS
JOINT SWELLING: 0
ROS SKIN COMMENTS: POSITIVE FOR ITCHING.
NAUSEA: 0
ADENOPATHY: 0
COUGH: 0
WHEEZING: 0
EYE ITCHING: 0
EYE REDNESS: 0
SHORTNESS OF BREATH: 0
EYE DISCHARGE: 0
FACIAL SWELLING: 0
SINUS PRESSURE: 0
VOMITING: 0
DIARRHEA: 0
ARTHRALGIAS: 0
RHINORRHEA: 0
CHEST TIGHTNESS: 0
ACTIVITY CHANGE: 0
MYALGIAS: 0
CHILLS: 0
HEADACHES: 0
FEVER: 0

## 2022-05-31 NOTE — LETTER
5/31/2022         RE: Ariella Peterson  [address] [facility] [address]        Dear Colleague,    Thank you for referring your patient, Ariella Peterson, to the [facility]. Please see a copy of my visit note below.    Ariella Peterson was seen in the Allergy Clinic at [facility].    Ariella Peterson is a 31 year old  female being seen today at the request of Alla Medley PA-C in consultation for itching and rash.    Ariella reports that starting in February she began to have itching all over her body. When she would scratch she would develop a rash. She has been seen in urgent care and was prescribed prednisone which didn't seem to help. She started taking cetirizine which has helped but the itching returns if she stops the medication. Itching has occurred all over from head to toe - face, neck, extremities, trunk, bottom of her feet. She does not break out into a rash unless she scratches. Ariella last took cetirizine on Friday and diphenhydramine last night. The antihistamines do help to control her symptoms.    Ariella has not had associated symptoms including lip/tongue swelling, difficulty swallowing, cough, or difficulty breathing. Symptoms have occurred at home and in other environments. She has not any recent travel, illnesses, or change in medication. No changes in home/work environments. Has changed detergents without any change in symptoms. No change in her diet. Drinks alcohol occasionally - no change in symptoms after consuming alcohol. Does not take NSAID medications.    Ariella is pregnant - approximately 6 weeks.      PAST MEDICAL HISTORY:  None    Family History   Problem Relation Age of Onset     No Known Problems Mother      No Known Problems Father      Diabetes Paternal Grandfather      Colon Cancer Paternal Grandfather 86     Breast Cancer No family hx of      Coronary Artery Disease No family hx of      Past Surgical History:   Procedure Laterality Date      ANTERIOR CRUCIATE LIGAMENT REPAIR Bilateral        ENVIRONMENTAL HISTORY:   [place] The home is heated with a forced air. They do have central air conditioning. The patient's bedroom is furnished with carpeting in bedroom, allergen mattress cover, allergen pillowcase cover and fabric window coverings.  Pets inside the house include None. There is no history of cockroach or mice infestation. Do you smoke cigarettes or other recreational drugs? No Do you vape or use an e-cigarette? No. There is/are 0 smokers living in the house. There is/are 0 who smoke ecigarettes/vape living in the house. The house does not have a damp basement.     SOCIAL HISTORY:   Ariella is employed from home. She lives with her spouse and mother-in-law and child.  Her spouse works as a/an technician.    Review of Systems   Constitutional: Negative for activity change, chills and fever.   HENT: Negative for congestion, dental problem, ear pain, facial swelling, nosebleeds, postnasal drip, rhinorrhea, sinus pressure and sneezing.    Eyes: Negative for discharge, redness and itching.   Respiratory: Negative for cough, chest tightness, shortness of breath and wheezing.    Cardiovascular: Negative for chest pain.   Gastrointestinal: Negative for diarrhea, nausea and vomiting.   Musculoskeletal: Negative for arthralgias, joint swelling and myalgias.   Skin: Negative for rash.        Positive for itching.   Neurological: Negative for headaches.   Hematological: Negative for adenopathy.   Psychiatric/Behavioral: Negative for behavioral problems and self-injury.         Current Outpatient Medications:      cetirizine (ZYRTEC) 10 MG tablet, Take 10 mg by mouth daily, Disp: , Rfl:      diphenhydrAMINE (BENADRYL) 25 MG tablet, Take 25 mg by mouth every 6 hours as needed for itching or allergies, Disp: , Rfl:      Prenatal Vit-Fe Fumarate-FA (PRENATAL 19) 29-1 MG CHEW, , Disp: , Rfl:      letrozole (FEMARA) 2.5 MG  tablet, , Disp: , Rfl:      metFORMIN (GLUCOPHAGE) 500 MG tablet, , Disp: , Rfl:   Immunization History   Administered Date(s) Administered     COVID-19,PF,Pfizer (12+ Yrs) 01/30/2021, 02/20/2021, 12/29/2021     Flu, Unspecified 10/15/2016     Influenza Vaccine IM > 6 months Valent IIV4 (Alfuria,Fluzone) 09/30/2019, 11/04/2020     MMR 05/21/2019     TDAP Vaccine (Adacel) 06/29/2017     Allergies   Allergen Reactions     Cephalexin Hives         EXAM:   /76 (BP Location: Left arm, Patient Position: Sitting, Cuff Size: Adult Regular)   Pulse 88   Wt 68.1 kg (150 lb 1.6 oz)   SpO2 99%   BMI 30.32 kg/m    Physical Exam  Vitals and nursing note reviewed.   Constitutional:       Appearance: Normal appearance.   HENT:      Head: Normocephalic and atraumatic.      Right Ear: External ear normal.      Left Ear: External ear normal.      Nose: No mucosal edema or rhinorrhea.      Right Turbinates: Not enlarged, swollen or pale.      Left Turbinates: Not enlarged, swollen or pale.      Mouth/Throat:      Mouth: Mucous membranes are moist. No oral lesions.      Pharynx: Oropharynx is clear. Uvula midline. No posterior oropharyngeal erythema.   Eyes:      General: Lids are normal. No scleral icterus.     Extraocular Movements: Extraocular movements intact.      Conjunctiva/sclera: Conjunctivae normal.   Neck:      Comments: Symmetric, no scars or lesions  Cardiovascular:      Rate and Rhythm: Normal rate and regular rhythm.      Heart sounds: Normal heart sounds, S1 normal and S2 normal.   Pulmonary:      Effort: Pulmonary effort is normal. No prolonged expiration or respiratory distress.      Breath sounds: Normal breath sounds and air entry.   Musculoskeletal:      Cervical back: Normal range of motion and neck supple.      Comments: No musculoskeletal defects noted   Skin:     Findings: No lesion or rash.      Comments: Dermatographism elicited on exam   Neurological:      General: No focal deficit present.       Mental Status: She is alert.   Psychiatric:         Mood and Affect: Mood and affect normal.         Behavior: Behavior normal.         WORKUP: None    ASSESSMENT/PLAN:  Ariella Peterson is a 31 year old female here for evaluation of itching and rash.    1. Dermatographism - Ariella reports she has had recurrent itching and rash since February. She does not develop the rash unless she scratches the area of itching. This was elicited on exam today. Her symptoms have not been associated with any identifiable triggers. She received her COVID booster approximately 2 months prior to the onset of her symptoms and we discussed that her symptoms may be due to immune activation resulting from the vaccine. She was advised that this does not represent an allergic reaction or contraindication to vaccination in the future. We reviewed the pathophysiology and management of her symptoms. If related to the immune response from vaccination these symptoms generally resolve after several months.    - recommend continuing cetirizine - 10mg daily  - may try weaning medication slowly after 3 months of good control, if symptoms return then resume medication      Follow-up in 3-6 months or as needed      Thank you for allowing me to participate in the care of Ariella Peterson.      Estefanía Jack MD, FAAAAI  Allergy/Immunology  [facility]      Chart documentation done in part with Dragon Voice Recognition Software. Although reviewed after completion, some word and grammatical errors may remain.      Again, thank you for allowing me to participate in the care of your patient.        Sincerely,        Estefanía Jack MD

## 2022-05-31 NOTE — PATIENT INSTRUCTIONS
If you have any questions regarding your allergies, asthma, or what we discussed during your visit today please call the allergy clinic or contact us via [email or web address].    [facility] Allergy RN Line: [phone] - call this number with any questions during or after business/clinic hours  [facility] Scheduling Line: [phone]  [facility] Scheduling Line: [phone] - this number is ONLY for scheduling at the [facility] and should not be used to get in touch with the allergy team    All visits for food challenges, medication/drug allergy testing, and drug challenges MUST be scheduled through the allergy clinic nurse. Please call the nurse at [phone] or send a [email or web address] message for scheduling. Appointments for these visits that are made through the schedulers or via [email or web address] may be cancelled or rescheduled.    Clinic Schedule:   [place] - Monday, Tuesday, and Thursday  [address] [place] [address]    [facility] - Wednesday  [address] [place] [address]      You have a condition called dermatographism    This is not caused by any underlying food or environmental allergies    Cetirizine (Zyrtec) is safe to continue taking - I recommend continuing it daily x 3 months and then trying to slowly wean off of the medication - skip a day or take 1/2 a tablet and see if your symptoms return    Follow-up in 3-6 months if your symptoms are worsening or any new concerns

## 2022-05-31 NOTE — PROGRESS NOTES
Ariella Peterson was seen in the Allergy Clinic at [facility].    Ariella Peterson is a 31 year old  female being seen today at the request of Alla Medley PA-C in consultation for itching and rash.    Ariella reports that starting in February she began to have itching all over her body. When she would scratch she would develop a rash. She has been seen in urgent care and was prescribed prednisone which didn't seem to help. She started taking cetirizine which has helped but the itching returns if she stops the medication. Itching has occurred all over from head to toe - face, neck, extremities, trunk, bottom of her feet. She does not break out into a rash unless she scratches. Ariella last took cetirizine on Friday and diphenhydramine last night. The antihistamines do help to control her symptoms.    Ariella has not had associated symptoms including lip/tongue swelling, difficulty swallowing, cough, or difficulty breathing. Symptoms have occurred at home and in other environments. She has not any recent travel, illnesses, or change in medication. No changes in home/work environments. Has changed detergents without any change in symptoms. No change in her diet. Drinks alcohol occasionally - no change in symptoms after consuming alcohol. Does not take NSAID medications.    Ariella is pregnant - approximately 6 weeks.      PAST MEDICAL HISTORY:  None    Family History   Problem Relation Age of Onset     No Known Problems Mother      No Known Problems Father      Diabetes Paternal Grandfather      Colon Cancer Paternal Grandfather 86     Breast Cancer No family hx of      Coronary Artery Disease No family hx of      Past Surgical History:   Procedure Laterality Date     ANTERIOR CRUCIATE LIGAMENT REPAIR Bilateral        ENVIRONMENTAL HISTORY:   [place] The home is heated with a forced air. They do have central air conditioning. The patient's bedroom is furnished with  carpeting in bedroom, allergen mattress cover, allergen pillowcase cover and fabric window coverings.  Pets inside the house include None. There is no history of cockroach or mice infestation. Do you smoke cigarettes or other recreational drugs? No Do you vape or use an e-cigarette? No. There is/are 0 smokers living in the house. There is/are 0 who smoke ecigarettes/vape living in the house. The house does not have a damp basement.     SOCIAL HISTORY:   Ariella is employed from home. She lives with her spouse and mother-in-law and child.  Her spouse works as a/an technician.    Review of Systems   Constitutional: Negative for activity change, chills and fever.   HENT: Negative for congestion, dental problem, ear pain, facial swelling, nosebleeds, postnasal drip, rhinorrhea, sinus pressure and sneezing.    Eyes: Negative for discharge, redness and itching.   Respiratory: Negative for cough, chest tightness, shortness of breath and wheezing.    Cardiovascular: Negative for chest pain.   Gastrointestinal: Negative for diarrhea, nausea and vomiting.   Musculoskeletal: Negative for arthralgias, joint swelling and myalgias.   Skin: Negative for rash.        Positive for itching.   Neurological: Negative for headaches.   Hematological: Negative for adenopathy.   Psychiatric/Behavioral: Negative for behavioral problems and self-injury.         Current Outpatient Medications:      cetirizine (ZYRTEC) 10 MG tablet, Take 10 mg by mouth daily, Disp: , Rfl:      diphenhydrAMINE (BENADRYL) 25 MG tablet, Take 25 mg by mouth every 6 hours as needed for itching or allergies, Disp: , Rfl:      Prenatal Vit-Fe Fumarate-FA (PRENATAL 19) 29-1 MG CHEW, , Disp: , Rfl:      letrozole (FEMARA) 2.5 MG tablet, , Disp: , Rfl:      metFORMIN (GLUCOPHAGE) 500 MG tablet, , Disp: , Rfl:   Immunization History   Administered Date(s) Administered     COVID-19,PF,Pfizer (12+ Yrs) 01/30/2021, 02/20/2021, 12/29/2021     Flu, Unspecified 10/15/2016      Influenza Vaccine IM > 6 months Valent IIV4 (Alfuria,Fluzone) 09/30/2019, 11/04/2020     MMR 05/21/2019     TDAP Vaccine (Adacel) 06/29/2017     Allergies   Allergen Reactions     Cephalexin Hives         EXAM:   /76 (BP Location: Left arm, Patient Position: Sitting, Cuff Size: Adult Regular)   Pulse 88   Wt 68.1 kg (150 lb 1.6 oz)   SpO2 99%   BMI 30.32 kg/m    Physical Exam  Vitals and nursing note reviewed.   Constitutional:       Appearance: Normal appearance.   HENT:      Head: Normocephalic and atraumatic.      Right Ear: External ear normal.      Left Ear: External ear normal.      Nose: No mucosal edema or rhinorrhea.      Right Turbinates: Not enlarged, swollen or pale.      Left Turbinates: Not enlarged, swollen or pale.      Mouth/Throat:      Mouth: Mucous membranes are moist. No oral lesions.      Pharynx: Oropharynx is clear. Uvula midline. No posterior oropharyngeal erythema.   Eyes:      General: Lids are normal. No scleral icterus.     Extraocular Movements: Extraocular movements intact.      Conjunctiva/sclera: Conjunctivae normal.   Neck:      Comments: Symmetric, no scars or lesions  Cardiovascular:      Rate and Rhythm: Normal rate and regular rhythm.      Heart sounds: Normal heart sounds, S1 normal and S2 normal.   Pulmonary:      Effort: Pulmonary effort is normal. No prolonged expiration or respiratory distress.      Breath sounds: Normal breath sounds and air entry.   Musculoskeletal:      Cervical back: Normal range of motion and neck supple.      Comments: No musculoskeletal defects noted   Skin:     Findings: No lesion or rash.      Comments: Dermatographism elicited on exam   Neurological:      General: No focal deficit present.      Mental Status: She is alert.   Psychiatric:         Mood and Affect: Mood and affect normal.         Behavior: Behavior normal.         WORKUP: None    ASSESSMENT/PLAN:  Ariella Peterson is a 31 year old female here for evaluation of itching and  rash.    1. Dermatographism - Ariella reports she has had recurrent itching and rash since February. She does not develop the rash unless she scratches the area of itching. This was elicited on exam today. Her symptoms have not been associated with any identifiable triggers. She received her COVID booster approximately 2 months prior to the onset of her symptoms and we discussed that her symptoms may be due to immune activation resulting from the vaccine. She was advised that this does not represent an allergic reaction or contraindication to vaccination in the future. We reviewed the pathophysiology and management of her symptoms. If related to the immune response from vaccination these symptoms generally resolve after several months.    - recommend continuing cetirizine - 10mg daily  - may try weaning medication slowly after 3 months of good control, if symptoms return then resume medication      Follow-up in 3-6 months or as needed      Thank you for allowing me to participate in the care of Ariella Peterson.      Estefanía Jack MD, FAAAAI  Allergy/Immunology  [facility]      Chart documentation done in part with Dragon Voice Recognition Software. Although reviewed after completion, some word and grammatical errors may remain.

## 2022-07-06 ENCOUNTER — TRANSFERRED RECORDS (OUTPATIENT)
Dept: MULTI SPECIALTY CLINIC | Facility: CLINIC | Age: 32
End: 2022-07-06

## 2022-07-06 LAB
HPV ABSTRACT: NORMAL
PAP-ABSTRACT: NORMAL

## 2022-10-01 ENCOUNTER — HEALTH MAINTENANCE LETTER (OUTPATIENT)
Age: 32
End: 2022-10-01

## 2023-02-05 ENCOUNTER — HEALTH MAINTENANCE LETTER (OUTPATIENT)
Age: 33
End: 2023-02-05

## 2023-03-03 ENCOUNTER — VIRTUAL VISIT (OUTPATIENT)
Dept: FAMILY MEDICINE | Facility: CLINIC | Age: 33
End: 2023-03-03
Payer: COMMERCIAL

## 2023-03-03 DIAGNOSIS — L50.9 URTICARIA: Primary | ICD-10-CM

## 2023-03-03 DIAGNOSIS — L50.3 DERMATOGRAPHISM: ICD-10-CM

## 2023-03-03 PROBLEM — Z87.59 HISTORY OF PRE-ECLAMPSIA: Status: ACTIVE | Noted: 2019-11-05

## 2023-03-03 PROBLEM — Z86.32 HISTORY OF DIET CONTROLLED GESTATIONAL DIABETES MELLITUS (GDM): Status: ACTIVE | Noted: 2019-11-05

## 2023-03-03 PROBLEM — Z34.90 PREGNANCY: Status: RESOLVED | Noted: 2017-02-23 | Resolved: 2023-03-03

## 2023-03-03 PROBLEM — O14.93 PRE-ECLAMPSIA IN THIRD TRIMESTER: Status: RESOLVED | Noted: 2017-06-26 | Resolved: 2023-03-03

## 2023-03-03 PROCEDURE — 99213 OFFICE O/P EST LOW 20 MIN: CPT | Mod: VID | Performed by: PHYSICIAN ASSISTANT

## 2023-03-03 NOTE — Clinical Note
Please abstract the following data from this visit with this patient into the appropriate field in Epic:  Other Tests found in the patient's chart through Chart Review/Care Everywhere:  Pap smear done by this group [facility] on this date: 07/06/22 and HPV done by this group [facility] on this date: 07/06/22  Note to Abstraction: If this section is blank, no results were found via Chart Review/Care Everywhere. Keli Giron, CMA

## 2023-03-03 NOTE — PROGRESS NOTES
Ariella is a 32 year old who is being evaluated via a billable video visit.      How would you like to obtain your AVS? MyChart  If the video visit is dropped, the invitation should be resent by: Text to cell phone: [phone]  Will anyone else be joining your video visit? No          Assessment & Plan     Urticaria  Dermatographism  Discussed dermatographism. Patient thinks she has itchiness first, then hives appear. Wonders about causes of itchiness. Suggest returning to allergy - could see derm as well.   - Adult Allergy/Asthma Referral; Future                 Return in about 3 months (around 6/3/2023) for Routine preventive.    Estefani Brooks PA-C  [facility]    Tierra Krishnan is a 32 year old accompanied by her self, presenting for the following health issues:  RECHECK (Hives. Hives had been going on since 02/22. Was seen by allergist Dr. Jack on 05/03/2022 and taking zyrtec every 24 hours but hives has not gone away. )      History of Present Illness       Reason for visit:  Hives    She eats 2-3 servings of fruits and vegetables daily.She consumes 1 sweetened beverage(s) daily.She exercises with enough effort to increase her heart rate 10 to 19 minutes per day.  She exercises with enough effort to increase her heart rate 3 or less days per week.   She is taking medications regularly.     Recently had baby 01/2023.    Saw Dr Brower - dermatographism.     Feels she has itchy skin. Can't identify allergens.              Review of Systems   Constitutional, HEENT, cardiovascular, pulmonary, gi and gu systems are negative, except as otherwise noted.      Objective           Vitals:  No vitals were obtained today due to virtual visit.    Physical Exam   GENERAL: Healthy, alert and no distress  EYES: Eyes grossly normal to inspection.  No discharge or erythema, or obvious scleral/conjunctival abnormalities.  RESP: No audible wheeze, cough, or visible cyanosis.  No visible retractions or  increased work of breathing.    SKIN: Visible skin clear. No significant rash, abnormal pigmentation or lesions.  NEURO: Cranial nerves grossly intact.  Mentation and speech appropriate for age.  PSYCH: Mentation appears normal, affect normal/bright, judgement and insight intact, normal speech and appearance well-groomed.                Video-Visit Details    Type of service:  Video Visit     Originating Location (pt. Location): Home    Distant Location (provider location):  On-site  Platform used for Video Visit: [employer]

## 2024-03-03 ENCOUNTER — HEALTH MAINTENANCE LETTER (OUTPATIENT)
Age: 34
End: 2024-03-03

## 2025-02-28 NOTE — PROGRESS NOTES
Ariella's son, Devin, was recently diagnosed with classic EDS due to a pathogenic variant in COL5A1. Kim-Danlos syndrome is a group of disorders that affect connective tissues supporting the skin, bones, blood vessels, and many other organs and tissues. Defects in connective tissues cause the signs and symptoms of these conditions, which range from mildly loose joints to life-threatening complications.    While Ariella's medical history is not particularly suspicious for classic EDS, if they were to be identified with the condition, this information would be incredibly impactful for their ongoing medical management. Furthermore, this information would clarify the risks to Devin's family members, including his siblings.     We reviewed that Devin was found to have an uncertain variant in the MYLK gene. A variant of uncertain significance or VUS represents a change in genetic information for which we are unsure of the classification (i.e. benign change or pathogenic change).  Frequently, VUS are downgraded to benign variation with additional information and time.  For these reasons, a VUS does not establish a molecular diagnosis. Furthermore, if this variant were present in either of Devin's healthy parents, it would further reduce the likelihood that it is disease-causing.     Genetic Information Nondiscrimination Act (STEPHANIE)  We discussed the Genetic Information Nondiscrimination Act (STEPHANIE) of 2008.  STEPHANIE prohibits discrimination in health coverage because of genetic information. Genetic information refers to an individual's genetic tests and family medical history (including family member's genetic tests). So, health insurers may not use genetic information to determine if someone is eligible for insurance or to make coverage, underwriting or premium-setting decisions. Therefore, it is illegal for a patient's health insurer to use family health history and genetic test results as a reason to deny health insurance or  decide costs of health insurance.    STEPHANIE also prohibits discrimination in employment (employees and applicants) because of genetic information. Genetic information refers to an individual's genetic tests and family medical history (including family member's genetic tests). This means that STEPHANIE prevents employers from using genetic information in employment decisions such as hiring, firing, promotions, pay, and job assignments. However, the [place]  and some other professions are permitted to use genetic and medical information to make employment decisions.    There are other exceptions to STEPHANIE. STEPHANIE's protection applies to employers of 15 or more employees. STEPHANIE does not apply to life insurance, long term care insurance, and disability insurance, though some states have state laws that offer additional protections against genetic discrimination in these lines of insurance.     For more information refer to: https://www.genome.gov/about-genomics/policy-issues/Genetic-Discrimination    1. Ariella expressed verbal informed consent to proceed with genetic testing ([facility] COL5A1 and MYLK familial variant testing) via [facility]'s free familial variant testing program. I will request that the lab mail a buccal collection kit to their home address. Ariella will follow the included instructions and mail back to the laboratory.     Reviewed that the laboratories billing policies are unfortunately changing. After speaking with representatives from the laboratory ([facility]) it is not clear if orders placed for Ariella will default to previous billing policy (free familial variant testing) or new billing policy where testing must process through insurance. Orders for parents were placed with instructions to not proceed if not eligible for free familial variant testing. We will likely not know right away if testing for parents will be able to proceed, but I will share updates with the family as I have them.     2. The results of  genetic testing are available ~3 weeks after the sample is received by the laboratory.  I will call Ariella with the results when they are available. Results will not be left via voicemail, regardless of outcome.  3. Contact information provided.    Ese Carmona MS [facility]  Genetic Counselor  Email: [email or web address]  Phone: [phone]  Pager: [phone]

## 2025-03-15 ENCOUNTER — HEALTH MAINTENANCE LETTER (OUTPATIENT)
Age: 35
End: 2025-03-15